# Patient Record
Sex: MALE | Race: WHITE | Employment: FULL TIME | URBAN - METROPOLITAN AREA
[De-identification: names, ages, dates, MRNs, and addresses within clinical notes are randomized per-mention and may not be internally consistent; named-entity substitution may affect disease eponyms.]

---

## 2017-01-29 ENCOUNTER — APPOINTMENT (OUTPATIENT)
Dept: GENERAL RADIOLOGY | Age: 32
End: 2017-01-29
Attending: EMERGENCY MEDICINE
Payer: SELF-PAY

## 2017-01-29 ENCOUNTER — HOSPITAL ENCOUNTER (EMERGENCY)
Age: 32
Discharge: HOME | End: 2017-01-29
Attending: EMERGENCY MEDICINE
Payer: SELF-PAY

## 2017-01-29 DIAGNOSIS — T14.8XXA MUSCLE STRAIN: Primary | ICD-10-CM

## 2017-01-29 PROCEDURE — 99282 EMERGENCY DEPT VISIT SF MDM: CPT

## 2017-01-29 PROCEDURE — 73060 X-RAY EXAM OF HUMERUS: CPT

## 2017-01-29 NOTE — DISCHARGE INSTRUCTIONS
Biceps Tendinitis: Exercises  Your Care Instructions  Here are some examples of typical rehabilitation exercises for your condition. Start each exercise slowly. Ease off the exercise if you start to have pain. Your doctor or physical therapist will tell you when you can start these exercises and which ones will work best for you. How to do the exercises  Biceps stretch    1. Stand and hold your affected arm out to the side, with your hand at about hip level. 2. Gently bend your wrist back so that your fingers point down toward the floor. 3. You may also do this next to a wall and rest your fingers on the wall. 4. For more of a stretch, bend your head to the opposite side of your affected arm. 5. Hold for 15 to 30 seconds. 6. Repeat 2 to 4 times. Resisted supination    Note: For this and the following exercises, you will need elastic exercise material, such as surgical tubing or Thera-Band. 1. Sit leaning forward with your legs slightly spread. Then place your forearm on your thigh with your hand and affected wrist in front of your knee. 2. Grasp one end of an exercise band with your palm down, and step on the other end. 3. Keeping your wrist straight, roll your palm outward and away from your thigh for a count of 2, then slowly move your wrist back to the starting position to a count of 5.  4. Repeat 8 to 12 times. Resisted elbow flexion    1. Using your affected arm, hold one end of an elastic band in your hand. 2. Place the other end of the band under your foot on the same side of your body as your affected arm. 3. Slowly bend your elbow and bring your hand toward your shoulder. Your palm and the underside of your wrist should be facing up as you pull the band toward your shoulder. Count to 2 as you pull up. 4. Relax and slowly return to your starting position. Count to 5 as you return to the start. 5. Repeat 8 to 12 times. Resisted elbow flexion at shoulder level    1.  Tie the ends of an exercise band together to form a loop. Attach one end of the loop to a secure object or shut a door on it to hold it in place. (Or you can have someone hold one end of the loop to provide resistance.) The band should be at shoulder level. 2. Stand facing where you have tied or fastened the band. 3. Start with your affected arm held out straight, holding the band in your hand. 4. Slowly bend your elbow to 90 degrees, bringing your hand toward your forehead. Count to 2 as you pull the band toward your head. 5. Relax and slowly return to your starting position. Count to 5 as you return to the start. 6. Repeat 8 to 12 times. Follow-up care is a key part of your treatment and safety. Be sure to make and go to all appointments, and call your doctor if you are having problems. It's also a good idea to know your test results and keep a list of the medicines you take. Where can you learn more? Go to http://earline-ariel.info/. Enter I727 in the search box to learn more about \"Biceps Tendinitis: Exercises. \"  Current as of: May 23, 2016  Content Version: 11.1  © 4355-3892 [email or web address]. Care instructions adapted under license by [employer] (which disclaims liability or warranty for this information). If you have questions about a medical condition or this instruction, always ask your healthcare professional. [place] [address] any warranty or liability for your use of this information.

## 2017-01-29 NOTE — LETTER
[facility] EM[facility] 
[address] [id number] 
[phone] Work/School Note Date: 1/29/2017 To Whom It May concern: 
 
Tre Ace was seen and treated today in the emergency room by the following provider(s): 
Attending Provider: Justine Goldstein. MD Tash.   
 
Tre Ace may return to work on 1/30/17 to restricted duty. No lifting weight over 10 pounds until cleared by physician. Sincerely, 
 
 
 
 
Justine Goldstein.  MD Tash

## 2017-01-29 NOTE — ED NOTES
Pt received discharge instructions from Dr. Brenda Frances and verbalized understanding. Pt ambulatory to exit.
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
## 2021-12-23 ENCOUNTER — HOSPITAL ENCOUNTER (EMERGENCY)
Age: 36
Discharge: HOME | End: 2021-12-23
Attending: EMERGENCY MEDICINE

## 2021-12-23 VITALS
BODY MASS INDEX: 33.79 KG/M2 | WEIGHT: 236 LBS | HEIGHT: 70 IN | TEMPERATURE: 98.7 F | OXYGEN SATURATION: 99 % | HEART RATE: 60 BPM | DIASTOLIC BLOOD PRESSURE: 60 MMHG | SYSTOLIC BLOOD PRESSURE: 131 MMHG | RESPIRATION RATE: 18 BRPM

## 2021-12-23 DIAGNOSIS — R53.83 MALAISE AND FATIGUE: Primary | ICD-10-CM

## 2021-12-23 DIAGNOSIS — R53.81 MALAISE AND FATIGUE: Primary | ICD-10-CM

## 2021-12-23 DIAGNOSIS — I73.00 RAYNAUD'S PHENOMENON WITHOUT GANGRENE: ICD-10-CM

## 2021-12-23 LAB
ALBUMIN SERPL-MCNC: 3.6 G/DL (ref 3.5–5)
ALBUMIN/GLOB SERPL: 1 {RATIO} (ref 1.1–2.2)
ALP SERPL-CCNC: 60 U/L (ref 45–117)
ALT SERPL-CCNC: 35 U/L (ref 12–78)
ANION GAP SERPL CALC-SCNC: 5 MMOL/L (ref 5–15)
AST SERPL-CCNC: 26 U/L (ref 15–37)
BASOPHILS # BLD: 0.1 K/UL (ref 0–0.1)
BASOPHILS NFR BLD: 1 % (ref 0–1)
BILIRUB SERPL-MCNC: 0.2 MG/DL (ref 0.2–1)
BUN SERPL-MCNC: 19 MG/DL (ref 6–20)
BUN/CREAT SERPL: 18 (ref 12–20)
CALCIUM SERPL-MCNC: 8.8 MG/DL (ref 8.5–10.1)
CHLORIDE SERPL-SCNC: 107 MMOL/L (ref 97–108)
CO2 SERPL-SCNC: 28 MMOL/L (ref 21–32)
COMMENT, HOLDF: NORMAL
CREAT SERPL-MCNC: 1.06 MG/DL (ref 0.7–1.3)
DIFFERENTIAL METHOD BLD: NORMAL
EOSINOPHIL # BLD: 0.4 K/UL (ref 0–0.4)
EOSINOPHIL NFR BLD: 4 % (ref 0–7)
ERYTHROCYTE [DISTWIDTH] IN BLOOD BY AUTOMATED COUNT: 12.8 % (ref 11.5–14.5)
GLOBULIN SER CALC-MCNC: 3.5 G/DL (ref 2–4)
GLUCOSE SERPL-MCNC: 83 MG/DL (ref 65–100)
HCT VFR BLD AUTO: 44.4 % (ref 36.6–50.3)
HGB BLD-MCNC: 14.9 G/DL (ref 12.1–17)
IMM GRANULOCYTES # BLD AUTO: 0 K/UL (ref 0–0.04)
IMM GRANULOCYTES NFR BLD AUTO: 0 % (ref 0–0.5)
LYMPHOCYTES # BLD: 2 K/UL (ref 0.8–3.5)
LYMPHOCYTES NFR BLD: 23 % (ref 12–49)
MCH RBC QN AUTO: 31.4 PG (ref 26–34)
MCHC RBC AUTO-ENTMCNC: 33.6 G/DL (ref 30–36.5)
MCV RBC AUTO: 93.7 FL (ref 80–99)
MONOCYTES # BLD: 0.6 K/UL (ref 0–1)
MONOCYTES NFR BLD: 6 % (ref 5–13)
NEUTS SEG # BLD: 5.9 K/UL (ref 1.8–8)
NEUTS SEG NFR BLD: 66 % (ref 32–75)
NRBC # BLD: 0 K/UL (ref 0–0.01)
NRBC BLD-RTO: 0 PER 100 WBC
PLATELET # BLD AUTO: 225 K/UL (ref 150–400)
PMV BLD AUTO: 10.4 FL (ref 8.9–12.9)
POTASSIUM SERPL-SCNC: 3.7 MMOL/L (ref 3.5–5.1)
PROT SERPL-MCNC: 7.1 G/DL (ref 6.4–8.2)
RBC # BLD AUTO: 4.74 M/UL (ref 4.1–5.7)
SAMPLES BEING HELD,HOLD: NORMAL
SODIUM SERPL-SCNC: 140 MMOL/L (ref 136–145)
TSH SERPL DL<=0.05 MIU/L-ACNC: 1.03 UIU/ML (ref 0.36–3.74)
WBC # BLD AUTO: 9 K/UL (ref 4.1–11.1)

## 2021-12-23 PROCEDURE — 80053 COMPREHEN METABOLIC PANEL: CPT

## 2021-12-23 PROCEDURE — 84443 ASSAY THYROID STIM HORMONE: CPT

## 2021-12-23 PROCEDURE — 85025 COMPLETE CBC W/AUTO DIFF WBC: CPT

## 2021-12-23 PROCEDURE — 99282 EMERGENCY DEPT VISIT SF MDM: CPT

## 2021-12-23 NOTE — ED NOTES
Pt has noticed feeling a lot more fatigued with some weight gain (10-15lbs within the last month). Pt then noticed right shoulder pain and 3rd digit began to change colors and went numb, cold tip of finger, pain down distal from shoulder into eldbow and down distal to wrist and hand. Pt uses biofreeze and ibuprofen at home for pain. No medications for pain today.

## 2021-12-24 NOTE — ED PROVIDER NOTES
EMERGENCY DEPARTMENT HISTORY AND PHYSICAL EXAM      Date: 12/23/2021  Patient Name: Hallie Kaba    History of Presenting Illness     Chief Complaint   Patient presents with    Shoulder Pain     Pt report al symptoms x 1 month ago of decreased energy and rigth shoulder pain     Elbow Pain    Finger Pain     Pt noticed his right ring finger is numb and ice cold        History Provided By: Patient    HPI: Hallie Kaba, 31 y.o. male with PMHx significant for nothing who presents with a cc of generalized fatigue for the last few weeks as well as a chief complaint of intermittent numbness and pallor to the right ring finger. He states that he has felt generally fatigued, has gained some weight, had joint pains ongoing for last 3 weeks. For last 2 days he has noted some pain and pallor to the right ring finger intermittently. He states the digit feels very cold and turns white. It self resolves. He has not noted in association with cold temperatures. No history of Raynaud's. PCP: None    There are no other complaints, changes, or physical findings at this time. Past History     Past Medical History:  No past medical history on file. Past Surgical History:  Past Surgical History:   Procedure Laterality Date    HX CARPAL TUNNEL RELEASE Right     HX HEENT      Right eye surgery    HX ORTHOPAEDIC      Thumb surgery     Family History:  Family History   Problem Relation Age of Onset    Lung Disease Maternal Grandfather     Lung Disease Paternal Grandfather      Social History:  Social History     Tobacco Use    Smoking status: Current Every Day Smoker     Packs/day: 1.00    Smokeless tobacco: Not on file   Substance Use Topics    Alcohol use: Yes     Comment: rare    Drug use: No     Allergies:   Allergies   Allergen Reactions    Amoxicillin Hives     Tolerates cephalexin    Codeine Other (comments)     Chest pain  Tolerates oxycodone/APAP     Review of Systems   Review of Systems Constitutional: Positive for fatigue. Negative for chills and fever. HENT: Negative for congestion, rhinorrhea and sore throat. Respiratory: Negative for cough and shortness of breath. Cardiovascular: Negative for chest pain. Gastrointestinal: Negative for abdominal pain, nausea and vomiting. Genitourinary: Negative for dysuria and urgency. Musculoskeletal: Positive for arthralgias. Skin: Negative for rash. Neurological: Negative for dizziness, light-headedness and headaches. All other systems reviewed and are negative. Physical Exam   Physical Exam  Vitals and nursing note reviewed. Constitutional:       General: He is not in acute distress. Appearance: He is well-developed. HENT:      Head: Normocephalic and atraumatic. Eyes:      Conjunctiva/sclera: Conjunctivae normal.      Pupils: Pupils are equal, round, and reactive to light. Cardiovascular:      Rate and Rhythm: Normal rate and regular rhythm. Pulmonary:      Effort: Pulmonary effort is normal. No respiratory distress. Breath sounds: Normal breath sounds. No stridor. Abdominal:      General: There is no distension. Palpations: Abdomen is soft. Tenderness: There is no abdominal tenderness. Musculoskeletal:         General: Normal range of motion. Cervical back: Normal range of motion. Comments: Right ring finger: Normal skin tone, normal capillary refill    2+ radial pulse   Skin:     General: Skin is warm and dry. Neurological:      Mental Status: He is alert and oriented to person, place, and time. Diagnostic Study Results   Labs -     Recent Results (from the past 12 hour(s))   SAMPLES BEING HELD    Collection Time: 12/23/21  6:04 PM   Result Value Ref Range    SAMPLES BEING HELD PST, LAV, RED     COMMENT        Add-on orders for these samples will be processed based on acceptable specimen integrity and analyte stability, which may vary by analyte.    CBC WITH AUTOMATED DIFF Collection Time: 12/23/21  7:08 PM   Result Value Ref Range    WBC 9.0 4.1 - 11.1 K/uL    RBC 4.74 4.10 - 5.70 M/uL    HGB 14.9 12.1 - 17.0 g/dL    HCT 44.4 36.6 - 50.3 %    MCV 93.7 80.0 - 99.0 FL    MCH 31.4 26.0 - 34.0 PG    MCHC 33.6 30.0 - 36.5 g/dL    RDW 12.8 11.5 - 14.5 %    PLATELET 620 503 - 973 K/uL    MPV 10.4 8.9 - 12.9 FL    NRBC 0.0 0  WBC    ABSOLUTE NRBC 0.00 0.00 - 0.01 K/uL    NEUTROPHILS 66 32 - 75 %    LYMPHOCYTES 23 12 - 49 %    MONOCYTES 6 5 - 13 %    EOSINOPHILS 4 0 - 7 %    BASOPHILS 1 0 - 1 %    IMMATURE GRANULOCYTES 0 0.0 - 0.5 %    ABS. NEUTROPHILS 5.9 1.8 - 8.0 K/UL    ABS. LYMPHOCYTES 2.0 0.8 - 3.5 K/UL    ABS. MONOCYTES 0.6 0.0 - 1.0 K/UL    ABS. EOSINOPHILS 0.4 0.0 - 0.4 K/UL    ABS. BASOPHILS 0.1 0.0 - 0.1 K/UL    ABS. IMM. GRANS. 0.0 0.00 - 0.04 K/UL    DF AUTOMATED     METABOLIC PANEL, COMPREHENSIVE    Collection Time: 12/23/21  7:08 PM   Result Value Ref Range    Sodium 140 136 - 145 mmol/L    Potassium 3.7 3.5 - 5.1 mmol/L    Chloride 107 97 - 108 mmol/L    CO2 28 21 - 32 mmol/L    Anion gap 5 5 - 15 mmol/L    Glucose 83 65 - 100 mg/dL    BUN 19 6 - 20 MG/DL    Creatinine 1.06 0.70 - 1.30 MG/DL    BUN/Creatinine ratio 18 12 - 20      GFR est AA >60 >60 ml/min/1.73m2    GFR est non-AA >60 >60 ml/min/1.73m2    Calcium 8.8 8.5 - 10.1 MG/DL    Bilirubin, total 0.2 0.2 - 1.0 MG/DL    ALT (SGPT) 35 12 - 78 U/L    AST (SGOT) 26 15 - 37 U/L    Alk. phosphatase 60 45 - 117 U/L    Protein, total 7.1 6.4 - 8.2 g/dL    Albumin 3.6 3.5 - 5.0 g/dL    Globulin 3.5 2.0 - 4.0 g/dL    A-G Ratio 1.0 (L) 1.1 - 2.2     TSH 3RD GENERATION    Collection Time: 12/23/21  7:08 PM   Result Value Ref Range    TSH 1.03 0.36 - 3.74 uIU/mL       Radiologic Studies -   No orders to display     No results found. Medical Decision Making   I am the first provider for this patient.     I reviewed the vital signs, available nursing notes, past medical history, past surgical history, family history and social history. Vital Signs-Reviewed the patient's vital signs. Patient Vitals for the past 12 hrs:   Temp Pulse Resp BP SpO2   12/23/21 1707 98.7 °F (37.1 °C) 60 18 131/60 99 %       Pulse Oximetry Analysis - 99% on ra    Records Reviewed: Nursing Notes and Old Medical Records    Provider Notes (Medical Decision Making):   Patient presents with fatigue, body aches, as well as intermittent pallor of the right fourth finger. Intermittent pallor and pain of the right fourth digit sounds most consistent with Raynaud's. Currently finger is not discolored. In terms of his fatigue and weight gain will check basic lab work to rule out electrolyte balance or anemia. Also check thyroid studies. ED Course:   Initial assessment performed. The patients presenting problems have been discussed, and they are in agreement with the care plan formulated and outlined with them. I have encouraged them to ask questions as they arise throughout their visit. Lab work unremarkable. Patient encouraged to follow-up with a primary care provider for additional testing as needed. Procedures:  Procedures    Critical Care:  none    Disposition:  Discharge Note:  The patient has been re-evaluated and is ready for discharge. Reviewed available results with patient. Counseled patient on diagnosis and care plan. Patient has expressed understanding, and all questions have been answered. Patient agrees with plan and agrees to follow up as recommended, or to return to the ED if their symptoms worsen. Discharge instructions have been provided and explained to the patient, along with reasons to return to the ED. PLAN:  1. There are no discharge medications for this patient.     2.   Follow-up Information     Follow up With Specialties Details Why Contact Info    your PCP  Schedule an appointment as soon as possible for a visit       [facility] EMERGENCY DEPT Emergency Medicine  As needed, If symptoms worsen [address] [facility] [address]  [phone]        Return to ED if worse     Diagnosis     Clinical Impression:   1. Malaise and fatigue    2. Raynaud's phenomenon without gangrene            Please note that this dictation was completed with [employer], the computer voice recognition software. Quite often unanticipated grammatical, syntax, homophones, and other interpretive errors are inadvertently transcribed by the computer software. Please disregard these errors.   Please excuse any errors that have escaped final proofreading

## 2022-07-25 ENCOUNTER — APPOINTMENT (OUTPATIENT)
Dept: CT IMAGING | Age: 37
End: 2022-07-25
Attending: PHYSICIAN ASSISTANT

## 2022-07-25 ENCOUNTER — HOSPITAL ENCOUNTER (EMERGENCY)
Age: 37
Discharge: HOME | End: 2022-07-25
Attending: STUDENT IN AN ORGANIZED HEALTH CARE EDUCATION/TRAINING PROGRAM

## 2022-07-25 VITALS
SYSTOLIC BLOOD PRESSURE: 121 MMHG | HEIGHT: 70 IN | DIASTOLIC BLOOD PRESSURE: 48 MMHG | OXYGEN SATURATION: 98 % | TEMPERATURE: 98.1 F | BODY MASS INDEX: 36.23 KG/M2 | RESPIRATION RATE: 16 BRPM | WEIGHT: 253.09 LBS | HEART RATE: 64 BPM

## 2022-07-25 DIAGNOSIS — R91.1 LUNG NODULE SEEN ON IMAGING STUDY: ICD-10-CM

## 2022-07-25 DIAGNOSIS — J01.90 ACUTE NON-RECURRENT SINUSITIS, UNSPECIFIED LOCATION: ICD-10-CM

## 2022-07-25 DIAGNOSIS — R09.89 GLOBUS SENSATION: Primary | ICD-10-CM

## 2022-07-25 LAB
ANION GAP SERPL CALC-SCNC: 5 MMOL/L (ref 5–15)
BASOPHILS # BLD: 0.1 K/UL (ref 0–0.1)
BASOPHILS NFR BLD: 1 % (ref 0–1)
BUN SERPL-MCNC: 18 MG/DL (ref 6–20)
BUN/CREAT SERPL: 16 (ref 12–20)
CALCIUM SERPL-MCNC: 9.3 MG/DL (ref 8.5–10.1)
CHLORIDE SERPL-SCNC: 106 MMOL/L (ref 97–108)
CO2 SERPL-SCNC: 28 MMOL/L (ref 21–32)
CREAT SERPL-MCNC: 1.12 MG/DL (ref 0.7–1.3)
DEPRECATED S PYO AG THROAT QL EIA: NEGATIVE
DIFFERENTIAL METHOD BLD: NORMAL
EOSINOPHIL # BLD: 0.3 K/UL (ref 0–0.4)
EOSINOPHIL NFR BLD: 4 % (ref 0–7)
ERYTHROCYTE [DISTWIDTH] IN BLOOD BY AUTOMATED COUNT: 12.9 % (ref 11.5–14.5)
GLUCOSE SERPL-MCNC: 87 MG/DL (ref 65–100)
HCT VFR BLD AUTO: 44.1 % (ref 36.6–50.3)
HGB BLD-MCNC: 14.5 G/DL (ref 12.1–17)
IMM GRANULOCYTES # BLD AUTO: 0 K/UL (ref 0–0.04)
IMM GRANULOCYTES NFR BLD AUTO: 0 % (ref 0–0.5)
LYMPHOCYTES # BLD: 1.8 K/UL (ref 0.8–3.5)
LYMPHOCYTES NFR BLD: 27 % (ref 12–49)
MCH RBC QN AUTO: 31 PG (ref 26–34)
MCHC RBC AUTO-ENTMCNC: 32.9 G/DL (ref 30–36.5)
MCV RBC AUTO: 94.2 FL (ref 80–99)
MONOCYTES # BLD: 0.5 K/UL (ref 0–1)
MONOCYTES NFR BLD: 8 % (ref 5–13)
NEUTS SEG # BLD: 4.1 K/UL (ref 1.8–8)
NEUTS SEG NFR BLD: 60 % (ref 32–75)
NRBC # BLD: 0 K/UL (ref 0–0.01)
NRBC BLD-RTO: 0 PER 100 WBC
PLATELET # BLD AUTO: 219 K/UL (ref 150–400)
PMV BLD AUTO: 10.3 FL (ref 8.9–12.9)
POTASSIUM SERPL-SCNC: 4 MMOL/L (ref 3.5–5.1)
RBC # BLD AUTO: 4.68 M/UL (ref 4.1–5.7)
SODIUM SERPL-SCNC: 139 MMOL/L (ref 136–145)
WBC # BLD AUTO: 6.8 K/UL (ref 4.1–11.1)

## 2022-07-25 PROCEDURE — 85025 COMPLETE CBC W/AUTO DIFF WBC: CPT

## 2022-07-25 PROCEDURE — 36415 COLL VENOUS BLD VENIPUNCTURE: CPT

## 2022-07-25 PROCEDURE — 70491 CT SOFT TISSUE NECK W/DYE: CPT

## 2022-07-25 PROCEDURE — 99285 EMERGENCY DEPT VISIT HI MDM: CPT

## 2022-07-25 PROCEDURE — 87070 CULTURE OTHR SPECIMN AEROBIC: CPT

## 2022-07-25 PROCEDURE — 74011000636 HC RX REV CODE- 636: Performed by: RADIOLOGY

## 2022-07-25 PROCEDURE — 80048 BASIC METABOLIC PNL TOTAL CA: CPT

## 2022-07-25 PROCEDURE — 87880 STREP A ASSAY W/OPTIC: CPT

## 2022-07-25 RX ORDER — AZITHROMYCIN 250 MG/1
TABLET, FILM COATED ORAL
Qty: 6 TABLET | Refills: 0 | Status: SHIPPED | OUTPATIENT
Start: 2022-07-25 | End: 2022-07-30

## 2022-07-25 RX ADMIN — IOPAMIDOL 100 ML: 755 INJECTION, SOLUTION INTRAVENOUS at 21:00

## 2022-07-25 NOTE — ED PROVIDER NOTES
EMERGENCY DEPARTMENT HISTORY AND PHYSICAL EXAM      Date: 7/25/2022  Patient Name: Amena Wyatt    History of Presenting Illness     Chief Complaint   Patient presents with    Neck Pain     X 1 week pt has had a sensation of tightness or swelling in the right side of his esophagus causing pain and tightness in his jaw and behind his ear. He states that for the most part he can swallow, but one day earlier in the week he ate and then had to throw up       History Provided By: Patient, wife at bedside    HPI: Amena Wyatt, 30 y.o. male without reported PMHx presents BIB self to the ED with cc of sensation of swelling in the right side of his neck x1 week. The patient describes the symptoms as \"like someone is pushing their thumb against the right side of my esophagus. \"  The discomfort radiates down towards the right upper chest. The patient also reports a tingling sensation to the right side of his jaw and cheek. Generally speaking the patient is eating and drinking normally, however last Wednesday he had several episodes of vomiting just after eating. The patient states \"I would eat and then it would just come right back up. \"  Denies fever, cough, shortness of breath, chest pain. The patient has had no relief with benadryl, Tums, or Rolaids. There are no other complaints, changes, or physical findings at this time. PCP: None    No current facility-administered medications on file prior to encounter. No current outpatient medications on file prior to encounter. Past History     Past Medical History:  No past medical history on file.     Past Surgical History:  Past Surgical History:   Procedure Laterality Date    HX CARPAL TUNNEL RELEASE Right     HX HEENT      Right eye surgery    HX ORTHOPAEDIC      Thumb surgery       Family History:  Family History   Problem Relation Age of Onset    Lung Disease Maternal Grandfather     Lung Disease Paternal Grandfather        Social History:  Social History     Tobacco Use    Smoking status: Every Day     Packs/day: 1.00     Types: Cigarettes   Substance Use Topics    Alcohol use: Yes     Comment: rare    Drug use: No       Allergies: Allergies   Allergen Reactions    Amoxicillin Hives     Tolerates cephalexin    Codeine Other (comments)     Chest pain  Tolerates oxycodone/APAP         Review of Systems   Review of Systems   Constitutional:  Negative for fever. HENT:  Negative for trouble swallowing and voice change. Globus sensation   Eyes:  Negative for redness. Respiratory:  Negative for shortness of breath. Cardiovascular:  Negative for chest pain. Gastrointestinal:  Positive for vomiting (several episodes last wednesday, none since). All other systems reviewed and are negative. Physical Exam   Physical Exam  Vitals and nursing note reviewed. Constitutional:       General: He is not in acute distress. Appearance: Normal appearance. He is not toxic-appearing. HENT:      Head: Normocephalic and atraumatic. Jaw: There is normal jaw occlusion. Comments: No facial or neck asymmetry appreciated externally. Nose: Nose normal.      Mouth/Throat:      Mouth: Mucous membranes are moist.      Comments: Faintly appreciable uvula deviation to the L. No oropharyngeal erythema, appreciable mass, or exudate. Eyes:      Extraocular Movements: Extraocular movements intact. Conjunctiva/sclera: Conjunctivae normal.   Neck:      Trachea: Phonation normal.   Cardiovascular:      Rate and Rhythm: Normal rate and regular rhythm. Pulmonary:      Effort: Pulmonary effort is normal.      Breath sounds: Normal breath sounds. Abdominal:      Palpations: Abdomen is soft. Tenderness: There is no abdominal tenderness. There is no guarding. Musculoskeletal:         General: Normal range of motion. Cervical back: Normal range of motion and neck supple. No rigidity or tenderness.    Lymphadenopathy:      Cervical: No cervical adenopathy. Skin:     General: Skin is warm and dry. Neurological:      General: No focal deficit present. Mental Status: He is alert and oriented to person, place, and time. GCS: GCS eye subscore is 4. GCS verbal subscore is 5. GCS motor subscore is 6. Cranial Nerves: No cranial nerve deficit. Psychiatric:         Mood and Affect: Mood normal.         Behavior: Behavior normal.       Diagnostic Study Results     Labs -     Recent Results (from the past 12 hour(s))   STREP AG SCREEN, GROUP A    Collection Time: 07/25/22  7:52 PM    Specimen: Swab; Throat   Result Value Ref Range    Group A Strep Ag ID Negative NEG     CBC WITH AUTOMATED DIFF    Collection Time: 07/25/22  7:52 PM   Result Value Ref Range    WBC 6.8 4.1 - 11.1 K/uL    RBC 4.68 4.10 - 5.70 M/uL    HGB 14.5 12.1 - 17.0 g/dL    HCT 44.1 36.6 - 50.3 %    MCV 94.2 80.0 - 99.0 FL    MCH 31.0 26.0 - 34.0 PG    MCHC 32.9 30.0 - 36.5 g/dL    RDW 12.9 11.5 - 14.5 %    PLATELET 090 184 - 880 K/uL    MPV 10.3 8.9 - 12.9 FL    NRBC 0.0 0  WBC    ABSOLUTE NRBC 0.00 0.00 - 0.01 K/uL    NEUTROPHILS 60 32 - 75 %    LYMPHOCYTES 27 12 - 49 %    MONOCYTES 8 5 - 13 %    EOSINOPHILS 4 0 - 7 %    BASOPHILS 1 0 - 1 %    IMMATURE GRANULOCYTES 0 0.0 - 0.5 %    ABS. NEUTROPHILS 4.1 1.8 - 8.0 K/UL    ABS. LYMPHOCYTES 1.8 0.8 - 3.5 K/UL    ABS. MONOCYTES 0.5 0.0 - 1.0 K/UL    ABS. EOSINOPHILS 0.3 0.0 - 0.4 K/UL    ABS. BASOPHILS 0.1 0.0 - 0.1 K/UL    ABS. IMM.  GRANS. 0.0 0.00 - 0.04 K/UL    DF AUTOMATED     METABOLIC PANEL, BASIC    Collection Time: 07/25/22  7:52 PM   Result Value Ref Range    Sodium 139 136 - 145 mmol/L    Potassium 4.0 3.5 - 5.1 mmol/L    Chloride 106 97 - 108 mmol/L    CO2 28 21 - 32 mmol/L    Anion gap 5 5 - 15 mmol/L    Glucose 87 65 - 100 mg/dL    BUN 18 6 - 20 MG/DL    Creatinine 1.12 0.70 - 1.30 MG/DL    BUN/Creatinine ratio 16 12 - 20      GFR est AA >60 >60 ml/min/1.73m2    GFR est non-AA >60 >60 ml/min/1.73m2 Calcium 9.3 8.5 - 10.1 MG/DL       Radiologic Studies -   CT NECK SOFT TISSUE W CONT   Final Result      1. A foreign body is not identified   2. Sinus mucosal inflammatory change   3. 10 mm mixed density groundglass/solid nodule in the right apex. It has a   peripheral calcification. Recommend follow-up in 6 months to ensure stability. CT Results  (Last 48 hours)                 07/25/22 2100  CT NECK SOFT TISSUE W CONT Final result    Impression:      1. A foreign body is not identified   2. Sinus mucosal inflammatory change   3. 10 mm mixed density groundglass/solid nodule in the right apex. It has a   peripheral calcification. Recommend follow-up in 6 months to ensure stability. Narrative:  INDICATION: R sided swelling and ? FB sensation       COMPARISON: None       TECHNIQUE:  Axial neck CT was performed after the uneventful administration of   IV contrast. Sagittal and coronal reformats were generated. CT dose reduction was achieved through use of a standardized protocol tailored   for this examination and automatic exposure control for dose modulation. CONTRAST: 100 mL Isovue 370       FINDINGS:       NASOPHARYNX: Mild sinus mucosal inflammatory change within the ethmoid and   sphenoid sinuses. .   SUPRAHYOID NECK: Normal oropharynx, oral cavity, parapharyngeal space, and   retropharyngeal space. INFRAHYOID NECK: Normal larynx, hypopharynx and supraglottis. PAROTID GLANDS: Normal.   SUBMANDIBULAR GLANDS: Normal.   THYROID GLAND: Normal. No nodule. LYMPH NODES: None enlarged by CT size criteria . LUNG APICES: 10 mm mixed density nodule in the right upper lobe. It has a small   eccentric area of calcification. .   OSSEOUS STRUCTURES: No destructive bone lesion. ADDITIONAL COMMENTS: N/A. CXR Results  (Last 48 hours)      None              Medical Decision Making   I am the first provider for this patient.     I reviewed the vital signs, available nursing notes, past medical history, past surgical history, family history and social history. Vital Signs-Reviewed the patient's vital signs. Patient Vitals for the past 12 hrs:   Temp Pulse Resp BP SpO2   07/25/22 1701 98.1 °F (36.7 °C) 64 16 (!) 121/48 98 %       Records Reviewed: Nursing Notes and Old Medical Records    Provider Notes (Medical Decision Making):     Ddx globus sensation, esophageal spasm, achalasia, diverticulum, Schatzki's ring, postnasal drip from sinusitis    ED Course:   Initial assessment performed. The patients presenting problems have been discussed, and they are in agreement with the care plan formulated and outlined with them. I have encouraged them to ask questions as they arise throughout their visit. ED Course as of 07/25/22 2133 Mon Jul 25, 2022 2125 Reviewed all lab and imaging findings with the patient and his wife. He is noted to be eating Chex mix without any difficulty. We discussed the findings of sinusitis on CT and the possibility that his throat symptoms may be related to postnasal drip. We will trial a course of azithromycin and assess for improvement. We also discussed the lung nodule found incidentally. The patient has been told about this incidental finding in the past.  I recommended that he follow-up with his PCP to compare to prior studies to ensure the nodule has remained stable. If symptoms persist after trial of azithromycin, I encouraged the patient to follow-up with gastroenterology for further evaluation. He may need barium studies and/or endoscopy. ED return precautions reviewed. The patient is in agreement this plan. [AS]      ED Course User Index  [AS] KHOA Higgins       Critical Care Time: None    Disposition:  dc    PLAN:  1. Current Discharge Medication List        START taking these medications    Details   azithromycin (Zithromax Z-Lane) 250 mg tablet Take 2 tablets on day 1, then 1 tablet daily for days 2 through 5.   Qty: 6 Tablet, Refills: 0  Start date: 7/25/2022, End date: 7/30/2022             2.   Follow-up Information       Follow up With Specialties Details Why Contact Info    [facility] EMERGENCY DEPT Emergency Medicine  As needed, If symptoms worsen Jessica Carey [facility]  [phone]    Your PCP  Call  For follow up     [facility]  Call  For follow up [address] [address]          Return to ED if worse     Diagnosis     Clinical Impression:   1. Globus sensation    2. Acute non-recurrent sinusitis, unspecified location    3. Lung nodule seen on imaging study          Please note that this dictation was completed with [employer], the computer voice recognition software. Quite often unanticipated grammatical, syntax, homophones, and other interpretive errors are inadvertently transcribed by the computer software. Please disregards these errors. Please excuse any errors that have escaped final proofreading.

## 2022-07-27 LAB
BACTERIA SPEC CULT: NORMAL
SERVICE CMNT-IMP: NORMAL

## 2022-11-16 ENCOUNTER — TRANSCRIBE ORDER (OUTPATIENT)
Dept: SCHEDULING | Age: 37
End: 2022-11-16

## 2022-11-16 DIAGNOSIS — R91.1 COIN LESION: Primary | ICD-10-CM

## 2022-11-22 ENCOUNTER — HOSPITAL ENCOUNTER (OUTPATIENT)
Dept: CT IMAGING | Age: 37
Discharge: HOME | End: 2022-11-22
Attending: NURSE PRACTITIONER
Payer: COMMERCIAL

## 2022-11-22 DIAGNOSIS — R91.1 COIN LESION: ICD-10-CM

## 2022-11-22 PROCEDURE — 74011000636 HC RX REV CODE- 636

## 2022-11-22 PROCEDURE — 71260 CT THORAX DX C+: CPT

## 2022-11-22 RX ADMIN — IOPAMIDOL 100 ML: 612 INJECTION, SOLUTION INTRAVENOUS at 09:49

## 2023-03-13 ENCOUNTER — HOSPITAL ENCOUNTER (EMERGENCY)
Age: 38
Discharge: HOME | End: 2023-03-13
Attending: EMERGENCY MEDICINE
Payer: COMMERCIAL

## 2023-03-13 ENCOUNTER — APPOINTMENT (OUTPATIENT)
Dept: GENERAL RADIOLOGY | Age: 38
End: 2023-03-13
Attending: EMERGENCY MEDICINE
Payer: COMMERCIAL

## 2023-03-13 VITALS
DIASTOLIC BLOOD PRESSURE: 77 MMHG | HEIGHT: 70 IN | WEIGHT: 287.48 LBS | HEART RATE: 69 BPM | TEMPERATURE: 97.9 F | RESPIRATION RATE: 20 BRPM | BODY MASS INDEX: 41.16 KG/M2 | SYSTOLIC BLOOD PRESSURE: 131 MMHG | OXYGEN SATURATION: 94 %

## 2023-03-13 DIAGNOSIS — M54.50 ACUTE BILATERAL LOW BACK PAIN WITHOUT SCIATICA: Primary | ICD-10-CM

## 2023-03-13 LAB
ANION GAP SERPL CALC-SCNC: 1 MMOL/L (ref 5–15)
BASOPHILS # BLD: 0.1 K/UL (ref 0–0.1)
BASOPHILS NFR BLD: 1 % (ref 0–1)
BUN SERPL-MCNC: 12 MG/DL (ref 6–20)
BUN/CREAT SERPL: 10 (ref 12–20)
CALCIUM SERPL-MCNC: 8.9 MG/DL (ref 8.5–10.1)
CHLORIDE SERPL-SCNC: 104 MMOL/L (ref 97–108)
CK SERPL-CCNC: 188 U/L (ref 39–308)
CO2 SERPL-SCNC: 32 MMOL/L (ref 21–32)
CREAT SERPL-MCNC: 1.15 MG/DL (ref 0.7–1.3)
DIFFERENTIAL METHOD BLD: ABNORMAL
EOSINOPHIL # BLD: 0.2 K/UL (ref 0–0.4)
EOSINOPHIL NFR BLD: 3 % (ref 0–7)
ERYTHROCYTE [DISTWIDTH] IN BLOOD BY AUTOMATED COUNT: 13 % (ref 11.5–14.5)
GLUCOSE SERPL-MCNC: 95 MG/DL (ref 65–100)
HCT VFR BLD AUTO: 51.5 % (ref 36.6–50.3)
HGB BLD-MCNC: 16.6 G/DL (ref 12.1–17)
IMM GRANULOCYTES # BLD AUTO: 0 K/UL (ref 0–0.04)
IMM GRANULOCYTES NFR BLD AUTO: 0 % (ref 0–0.5)
LYMPHOCYTES # BLD: 1.5 K/UL (ref 0.8–3.5)
LYMPHOCYTES NFR BLD: 20 % (ref 12–49)
MCH RBC QN AUTO: 31.1 PG (ref 26–34)
MCHC RBC AUTO-ENTMCNC: 32.2 G/DL (ref 30–36.5)
MCV RBC AUTO: 96.6 FL (ref 80–99)
MONOCYTES # BLD: 0.6 K/UL (ref 0–1)
MONOCYTES NFR BLD: 9 % (ref 5–13)
NEUTS SEG # BLD: 4.9 K/UL (ref 1.8–8)
NEUTS SEG NFR BLD: 67 % (ref 32–75)
NRBC # BLD: 0 K/UL (ref 0–0.01)
NRBC BLD-RTO: 0 PER 100 WBC
PLATELET # BLD AUTO: 228 K/UL (ref 150–400)
PMV BLD AUTO: 10.1 FL (ref 8.9–12.9)
POTASSIUM SERPL-SCNC: 4.4 MMOL/L (ref 3.5–5.1)
RBC # BLD AUTO: 5.33 M/UL (ref 4.1–5.7)
SODIUM SERPL-SCNC: 137 MMOL/L (ref 136–145)
WBC # BLD AUTO: 7.2 K/UL (ref 4.1–11.1)

## 2023-03-13 PROCEDURE — 36415 COLL VENOUS BLD VENIPUNCTURE: CPT

## 2023-03-13 PROCEDURE — 99284 EMERGENCY DEPT VISIT MOD MDM: CPT

## 2023-03-13 PROCEDURE — 80048 BASIC METABOLIC PNL TOTAL CA: CPT

## 2023-03-13 PROCEDURE — 74011250636 HC RX REV CODE- 250/636: Performed by: EMERGENCY MEDICINE

## 2023-03-13 PROCEDURE — 96374 THER/PROPH/DIAG INJ IV PUSH: CPT

## 2023-03-13 PROCEDURE — 96375 TX/PRO/DX INJ NEW DRUG ADDON: CPT

## 2023-03-13 PROCEDURE — 85025 COMPLETE CBC W/AUTO DIFF WBC: CPT

## 2023-03-13 PROCEDURE — 82550 ASSAY OF CK (CPK): CPT

## 2023-03-13 PROCEDURE — 72202 X-RAY EXAM SI JOINTS 3/> VWS: CPT

## 2023-03-13 RX ORDER — METHOCARBAMOL 750 MG/1
750 TABLET, FILM COATED ORAL
Qty: 21 TABLET | Refills: 0 | Status: SHIPPED | OUTPATIENT
Start: 2023-03-13

## 2023-03-13 RX ORDER — MORPHINE SULFATE 2 MG/ML
4 INJECTION, SOLUTION INTRAMUSCULAR; INTRAVENOUS
Status: COMPLETED | OUTPATIENT
Start: 2023-03-13 | End: 2023-03-13

## 2023-03-13 RX ORDER — ONDANSETRON 2 MG/ML
4 INJECTION INTRAMUSCULAR; INTRAVENOUS ONCE
Status: COMPLETED | OUTPATIENT
Start: 2023-03-13 | End: 2023-03-13

## 2023-03-13 RX ORDER — KETOROLAC TROMETHAMINE 30 MG/ML
30 INJECTION, SOLUTION INTRAMUSCULAR; INTRAVENOUS
Status: COMPLETED | OUTPATIENT
Start: 2023-03-13 | End: 2023-03-13

## 2023-03-13 RX ORDER — METHYLPREDNISOLONE 4 MG/1
TABLET ORAL
Qty: 1 DOSE PACK | Refills: 0 | Status: SHIPPED | OUTPATIENT
Start: 2023-03-13

## 2023-03-13 RX ORDER — OXYCODONE AND ACETAMINOPHEN 5; 325 MG/1; MG/1
1 TABLET ORAL
Qty: 12 TABLET | Refills: 0 | Status: SHIPPED | OUTPATIENT
Start: 2023-03-13 | End: 2023-03-16

## 2023-03-13 RX ADMIN — ONDANSETRON 4 MG: 2 INJECTION INTRAMUSCULAR; INTRAVENOUS at 12:07

## 2023-03-13 RX ADMIN — KETOROLAC TROMETHAMINE 30 MG: 30 INJECTION, SOLUTION INTRAMUSCULAR at 12:07

## 2023-03-13 RX ADMIN — MORPHINE SULFATE 4 MG: 2 INJECTION, SOLUTION INTRAMUSCULAR; INTRAVENOUS at 12:08

## 2023-03-13 NOTE — DISCHARGE INSTRUCTIONS
You are seen in the ER for your symptoms. Your x-ray was normal.  Please continue to take Motrin as well as the steroids. Please use the muscle relaxers as needed. Use the Percocet for severe pain. Please follow-up with your primary care doctor and the orthopedic doctor as needed. We are always happy to reevaluate you for new or worsening symptoms including weakness, difficulty urinating or having a bowel movement or numbness in your groin.

## 2023-03-13 NOTE — ED PROVIDER NOTES
[facility]  EMERGENCY DEPARTMENT ENCOUNTER       Pt Name: Rocco Campbell  MRN: [id number]  [place] 1985  Date of evaluation: 3/13/2023  Provider: Linda Márquez MD   PCP: Summer Vela NP  Note Started: 11:55 AM 3/13/23     CHIEF COMPLAINT       Chief Complaint   Patient presents with    Hip Pain     Patient reports after working out Friday (legs and back), bilateral hips and lower back began to hurt. He reports pain has increased. Not tender to touch but movement and breathing hurts. Denies  or GI sxs        HISTORY OF PRESENT ILLNESS: 1 or more elements      History From: patient, History limited by: none     Rocco Campbell is a 41 y.o. male with no significant past medical history presents emergency department with a chief complaint of hip pain. Patient reports on Friday he squatted approximately 400 pounds. He reports he did not have pain doing this and denies any acute injuries. He reports later that afternoon and into the evening he developed bilateral hip pain. The pain is located his bilateral SI joints and does not radiate to his midline back. He denies any leg numbness, saddle anesthesia, weakness or bladder/stool incontinence. He reports over the past weekend he has been bedbound due to the pain. He reports it radiates along his hip and is worse with lifting his leg and movement. Denies any abdominal pain. Denies vomiting or diarrhea. Has been taking ibuprofen without relief. Please See MDM for Additional Details of the HPI/PMH  Nursing Notes were all reviewed and agreed with or any disagreements were addressed in the HPI. REVIEW OF SYSTEMS        Positives and Pertinent negatives as per HPI. PAST HISTORY     Past Medical History:  No past medical history on file.     Past Surgical History:  Past Surgical History:   Procedure Laterality Date    HX CARPAL TUNNEL RELEASE Right     HX HEENT      Right eye surgery    HX ORTHOPAEDIC      Thumb surgery       Family History:  Family History   Problem Relation Age of Onset    Lung Disease Maternal Grandfather     Lung Disease Paternal Grandfather        Social History:  Social History     Tobacco Use    Smoking status: Every Day     Packs/day: 1.00     Types: Cigarettes   Substance Use Topics    Alcohol use: Yes     Comment: rare    Drug use: No       Allergies: Allergies   Allergen Reactions    Amoxicillin Hives     Tolerates cephalexin    Codeine Other (comments)     Chest pain  Tolerates oxycodone/APAP       CURRENT MEDICATIONS      Discharge Medication List as of 3/13/2023  1:22 PM          SCREENINGS               No data recorded         PHYSICAL EXAM      ED Triage Vitals   ED Encounter Vitals Group      BP 03/13/23 1054 (!) 139/48      Pulse (Heart Rate) 03/13/23 1054 72      Resp Rate 03/13/23 1054 18      Temp 03/13/23 1054 97.9 °F (36.6 °C)      Temp src --       O2 Sat (%) 03/13/23 1054 100 %      Weight 03/13/23 1055 287 lb 7.7 oz      Height 03/13/23 1055 5' 10\"        Physical Exam  Vitals and nursing note reviewed. Constitutional:       Comments: 42-year-old male, resting on stretcher, no acute distress   HENT:      Head: Normocephalic. Cardiovascular:      Rate and Rhythm: Normal rate and regular rhythm. Pulmonary:      Effort: Pulmonary effort is normal.   Abdominal:      General: Abdomen is flat. Tenderness: There is no abdominal tenderness. Musculoskeletal:         General: No tenderness. Normal range of motion. Comments: Lumbar spine without step-off or crepitus. No bony tenderness to palpation. Mild SI joint pain bilaterally. Skin:     General: Skin is warm. Neurological:      General: No focal deficit present. Mental Status: He is alert. Sensory: No sensory deficit. Motor: No weakness. Comments: Bilateral lower extremities without significant weakness or paresthesias.    Psychiatric:         Mood and Affect: Mood normal.        DIAGNOSTIC RESULTS   LABS: Recent Results (from the past 12 hour(s))   CBC WITH AUTOMATED DIFF    Collection Time: 03/13/23 11:13 AM   Result Value Ref Range    WBC 7.2 4.1 - 11.1 K/uL    RBC 5.33 4.10 - 5.70 M/uL    HGB 16.6 12.1 - 17.0 g/dL    HCT 51.5 (H) 36.6 - 50.3 %    MCV 96.6 80.0 - 99.0 FL    MCH 31.1 26.0 - 34.0 PG    MCHC 32.2 30.0 - 36.5 g/dL    RDW 13.0 11.5 - 14.5 %    PLATELET 543 135 - 229 K/uL    MPV 10.1 8.9 - 12.9 FL    NRBC 0.0 0  WBC    ABSOLUTE NRBC 0.00 0.00 - 0.01 K/uL    NEUTROPHILS 67 32 - 75 %    LYMPHOCYTES 20 12 - 49 %    MONOCYTES 9 5 - 13 %    EOSINOPHILS 3 0 - 7 %    BASOPHILS 1 0 - 1 %    IMMATURE GRANULOCYTES 0 0.0 - 0.5 %    ABS. NEUTROPHILS 4.9 1.8 - 8.0 K/UL    ABS. LYMPHOCYTES 1.5 0.8 - 3.5 K/UL    ABS. MONOCYTES 0.6 0.0 - 1.0 K/UL    ABS. EOSINOPHILS 0.2 0.0 - 0.4 K/UL    ABS. BASOPHILS 0.1 0.0 - 0.1 K/UL    ABS. IMM. GRANS. 0.0 0.00 - 0.04 K/UL    DF AUTOMATED     METABOLIC PANEL, BASIC    Collection Time: 03/13/23 11:13 AM   Result Value Ref Range    Sodium 137 136 - 145 mmol/L    Potassium 4.4 3.5 - 5.1 mmol/L    Chloride 104 97 - 108 mmol/L    CO2 32 21 - 32 mmol/L    Anion gap 1 (L) 5 - 15 mmol/L    Glucose 95 65 - 100 mg/dL    BUN 12 6 - 20 MG/DL    Creatinine 1.15 0.70 - 1.30 MG/DL    BUN/Creatinine ratio 10 (L) 12 - 20      eGFR >60 >60 ml/min/1.73m2    Calcium 8.9 8.5 - 10.1 MG/DL   CK    Collection Time: 03/13/23 11:13 AM   Result Value Ref Range     39 - 308 U/L        EKG: If performed, independent interpretation documented below in the MDM section     RADIOLOGY:  Non-plain film images such as CT, Ultrasound and MRI are read by the radiologist. Plain radiographic images are visualized and preliminarily interpreted by the ED Provider with the findings documented in the MDM section. Interpretation per the Radiologist below, if available at the time of this note:     XR SI JTS MIN 3 V    Result Date: 3/13/2023  EXAM: XR SI JTS MIN 3 V INDICATION: Back pain. COMPARISON: None. FINDINGS: An AP view of the sacrum and right and left posterior oblique views of the sacroiliac joints demonstrate no abnormality. Normal sacroiliac joints. PROCEDURES   Unless otherwise noted below, none  Procedures     CRITICAL CARE TIME   0    EMERGENCY DEPARTMENT COURSE and DIFFERENTIAL DIAGNOSIS/MDM   Vitals:    Vitals:    03/13/23 1054 03/13/23 1055 03/13/23 1218 03/13/23 1232   BP: (!) 139/48  133/83 131/77   Pulse: 72  69    Resp: 18  20    Temp: 97.9 °F (36.6 °C)      SpO2: 100%  93% 94%   Weight:  130.4 kg (287 lb 7.7 oz)     Height:  5' 10\" (1.778 m)          Patient was given the following medications:  Medications   ketorolac (TORADOL) injection 30 mg (30 mg IntraVENous Given 3/13/23 1207)   morphine injection 4 mg (4 mg IntraVENous Given 3/13/23 1208)   ondansetron (ZOFRAN) injection 4 mg (4 mg IntraVENous Given 3/13/23 1207)       Medical Decision Making  28-year-old male presents emergency department with a chief complaint of bilateral SI joint pain. Vitals are unremarkable. Suspect this is in setting of recent weight lifting. We will check plain film to exclude fracture although my suspicion for this is low, likely musculoskeletal, DJD or herniated disc. No evidence of cauda equina. No evidence of sciatica. Will medicate for pain and anticipate discharge. Amount and/or Complexity of Data Reviewed  Labs: ordered. Decision-making details documented in ED Course. Radiology: ordered and independent interpretation performed. Decision-making details documented in ED Course. Risk  Prescription drug management. ED Course as of 03/13/23 1344   Mon Mar 13, 2023   1240 Labs WNL [MB]   1306 Plain film as interpreted by me shows no acute fracture or dislocation. [MB]   1315 Reassessed, mild improvement in symptoms. Will discharge with supportive care, PCP follow-up, ortho PRN. Discussed returned precautions.  [MB]      ED Course User Index  [MB] Burton Fajardo MD         FINAL IMPRESSION     1. Acute bilateral low back pain without sciatica          DISPOSITION/PLAN   Dallin Holguin  results have been reviewed with him. He has been counseled regarding his diagnosis, treatment, and plan. He verbally conveys understanding and agreement of the signs, symptoms, diagnosis, treatment and prognosis and additionally agrees to follow up as discussed. He also agrees with the care-plan and conveys that all of his questions have been answered. I have also provided discharge instructions for him that include: educational information regarding their diagnosis and treatment, and list of reasons why they would want to return to the ED prior to their follow-up appointment, should his condition change. CLINICAL IMPRESSION    Discharged     PATIENT REFERRED TO:  Follow-up Information       Follow up With Specialties Details Why Contact Info    Greg Fraser NP Nurse Practitioner In 3 days  [address] [facility] [phone]      [facility] EM[facility] Emergency Medicine  If symptoms worsen [address]  [facility] [address] [facility]    Los Oneal MD Orthopedic Surgery In 1 week As needed [address]  [address] [facility] [address]  [address] [address] [phone]                DISCHARGE MEDICATIONS:  Discharge Medication List as of 3/13/2023  1:22 PM        START taking these medications    Details   oxyCODONE-acetaminophen (Percocet) 5-325 mg per tablet Take 1 Tablet by mouth every six (6) hours as needed for Pain for up to 3 days. Max Daily Amount: 4 Tablets., Normal, Disp-12 Tablet, R-0      methocarbamoL (ROBAXIN) 750 mg tablet Take 1 Tablet by mouth three (3) times daily as needed for Muscle Spasm(s). , Normal, Disp-21 Tablet, R-0      methylPREDNISolone (Medrol, Lane,) 4 mg tablet Use as directed, Normal, Disp-1 Dose Pack, R-0                 DISCONTINUED MEDICATIONS:  Discharge Medication List as of 3/13/2023  1:22 PM            I am the Primary Clinician of Record. Mabel Beverly MD (electronically signed)    (Please note that parts of this dictation were completed with voice recognition software. Quite often unanticipated grammatical, syntax, homophones, and other interpretive errors are inadvertently transcribed by the computer software. Please disregards these errors.  Please excuse any errors that have escaped final proofreading.)

## 2023-03-13 NOTE — ED NOTES
I have reviewed discharge instructions with the patient. The patient verbalized understanding. Current Discharge Medication List      START taking these medications    Details   oxyCODONE-acetaminophen (Percocet) 5-325 mg per tablet Take 1 Tablet by mouth every six (6) hours as needed for Pain for up to 3 days. Max Daily Amount: 4 Tablets. Qty: 12 Tablet, Refills: 0  Start date: 3/13/2023, End date: 3/16/2023    Associated Diagnoses: Acute bilateral low back pain without sciatica      methocarbamoL (ROBAXIN) 750 mg tablet Take 1 Tablet by mouth three (3) times daily as needed for Muscle Spasm(s).   Qty: 21 Tablet, Refills: 0  Start date: 3/13/2023      methylPREDNISolone (Medrol, Lane,) 4 mg tablet Use as directed  Qty: 1 Dose Pack, Refills: 0  Start date: 3/13/2023

## 2023-03-27 ENCOUNTER — APPOINTMENT (OUTPATIENT)
Dept: GENERAL RADIOLOGY | Age: 38
End: 2023-03-27
Attending: STUDENT IN AN ORGANIZED HEALTH CARE EDUCATION/TRAINING PROGRAM
Payer: COMMERCIAL

## 2023-03-27 ENCOUNTER — HOSPITAL ENCOUNTER (EMERGENCY)
Age: 38
Discharge: HOME | End: 2023-03-27
Attending: STUDENT IN AN ORGANIZED HEALTH CARE EDUCATION/TRAINING PROGRAM
Payer: COMMERCIAL

## 2023-03-27 ENCOUNTER — APPOINTMENT (OUTPATIENT)
Dept: CT IMAGING | Age: 38
End: 2023-03-27
Attending: STUDENT IN AN ORGANIZED HEALTH CARE EDUCATION/TRAINING PROGRAM
Payer: COMMERCIAL

## 2023-03-27 VITALS
OXYGEN SATURATION: 99 % | TEMPERATURE: 98.2 F | HEART RATE: 74 BPM | SYSTOLIC BLOOD PRESSURE: 130 MMHG | DIASTOLIC BLOOD PRESSURE: 59 MMHG | RESPIRATION RATE: 16 BRPM | HEIGHT: 70 IN | WEIGHT: 285 LBS | BODY MASS INDEX: 40.8 KG/M2

## 2023-03-27 DIAGNOSIS — L73.9 HAIR FOLLICLE INFECTION: ICD-10-CM

## 2023-03-27 DIAGNOSIS — R59.0 CERVICAL LYMPHADENOPATHY: Primary | ICD-10-CM

## 2023-03-27 PROCEDURE — 99285 EMERGENCY DEPT VISIT HI MDM: CPT

## 2023-03-27 PROCEDURE — 70491 CT SOFT TISSUE NECK W/DYE: CPT

## 2023-03-27 PROCEDURE — 71046 X-RAY EXAM CHEST 2 VIEWS: CPT

## 2023-03-27 PROCEDURE — 74011000636 HC RX REV CODE- 636: Performed by: STUDENT IN AN ORGANIZED HEALTH CARE EDUCATION/TRAINING PROGRAM

## 2023-03-27 RX ADMIN — IOMEPROL INJECTION 100 ML: 714 INJECTION, SOLUTION INTRAVASCULAR at 14:50

## 2023-03-27 NOTE — ED PROVIDER NOTES
[facility] EMERGENCY DEPT  EMERGENCY DEPARTMENT ENCOUNTER       Pt Name: Rachel Martinez  MRN: [id number]  [place] 1985  Date of evaluation: 3/27/2023  Provider: Linda Medina MD   PCP: Yulissa Vega NP  Note Started: 2:00 PM 3/27/23     CHIEF COMPLAINT       Chief Complaint   Patient presents with    Facial Pain     Pt reports lump Friday to left side of neck/jaw which has now progressed to left side of face causing pain, swelling, redness. Took 50 mg benadryl @0800           HISTORY OF PRESENT ILLNESS: 1 or more elements      History From: Patient, History limited by: none     Rachel Martinez is a 41 y.o. male presenting with left neck swelling and pain as well as left eye swelling and pain. He notes that started on Friday. It started on of the left submandibular region has been extending up over the temple to his left eye. Notes he thought it was a pimple or ingrown hair and was picking at it a bit. This did not help. Denies any changes in his vision. Denies any fever. Denies any difficulty swallowing. He notes he feels a little node under his submandibular region. Has not been sick recently. Took some ibuprofen and Benadryl this morning but it did not help. Denies any ear discharge, cough, congestion, nausea, vomiting. Please See MDM for Additional Details of the HPI/PMH  Nursing Notes were all reviewed and agreed with or any disagreements were addressed in the HPI. REVIEW OF SYSTEMS        Positives and Pertinent negatives as per HPI. PAST HISTORY     Past Medical History:  No past medical history on file.     Past Surgical History:  Past Surgical History:   Procedure Laterality Date    HX CARPAL TUNNEL RELEASE Right     HX HEENT      Right eye surgery    HX ORTHOPAEDIC      Thumb surgery       Family History:  Family History   Problem Relation Age of Onset    Lung Disease Maternal Grandfather     Lung Disease Paternal Grandfather        Social History:  Social History     Tobacco Use Smoking status: Every Day     Packs/day: 1.00     Types: Cigarettes   Substance Use Topics    Alcohol use: Yes     Comment: rare    Drug use: No       Allergies: Allergies   Allergen Reactions    Amoxicillin Hives     Tolerates cephalexin    Codeine Other (comments)     Chest pain  Tolerates oxycodone/APAP       CURRENT MEDICATIONS      Previous Medications    METHOCARBAMOL (ROBAXIN) 750 MG TABLET    Take 1 Tablet by mouth three (3) times daily as needed for Muscle Spasm(s). METHYLPREDNISOLONE (MEDROL, JERRI,) 4 MG TABLET    Use as directed       SCREENINGS               No data recorded         PHYSICAL EXAM      ED Triage Vitals [03/27/23 1313]   ED Encounter Vitals Group      BP (!) 130/59      Pulse (Heart Rate) 74      Resp Rate 16      Temp 98.2 °F (36.8 °C)      Temp src       O2 Sat (%) 99 %      Weight 285 lb      Height 5' 10\"        Physical Exam  Vitals reviewed. HENT:      Head: Normocephalic and atraumatic. Right Ear: Tympanic membrane and ear canal normal.      Left Ear: Tympanic membrane and ear canal normal.      Mouth/Throat:      Mouth: Mucous membranes are dry. Eyes:      Extraocular Movements: Extraocular movements intact. Pupils: Pupils are equal, round, and reactive to light. Comments: Scant erythema and swelling over the left eyelid. Extraocular is intact. Eyes himself appear normal   Neck:      Comments: Mild tenderness on the submandibular region. Small node appreciated submandibularly. Cardiovascular:      Rate and Rhythm: Normal rate and regular rhythm. Abdominal:      General: Abdomen is flat. Palpations: Abdomen is soft. Musculoskeletal:      Cervical back: Normal range of motion. No rigidity. DIAGNOSTIC RESULTS   LABS:     No results found for this or any previous visit (from the past 12 hour(s)). EKG:  If performed, independent interpretation documented below in the MDM section     RADIOLOGY:  Non-plain film images such as CT, Ultrasound and MRI are read by the radiologist. Plain radiographic images are visualized and preliminarily interpreted by the ED Provider with the findings documented in the MDM section. Interpretation per the Radiologist below, if available at the time of this note:     XR CHEST PA LAT    Result Date: 3/27/2023  EXAM: XR CHEST PA LAT INDICATION: Left-sided facial swelling, concerning for underlying chest tumor COMPARISON: None TECHNIQUE: PA and lateral chest views FINDINGS: The cardiac size is within normal limits. The pulmonary vasculature is within normal limits. The lungs and pleural spaces are clear. The visualized bones and upper abdomen are age-appropriate. Normal PA and lateral chest views. CT NECK SOFT TISSUE W CONT    Result Date: 3/27/2023  INDICATION: left submandibular swelling and redness extending up to left eyebrow COMPARISON: 7/25/2022 TECHNIQUE:  Axial neck CT was performed after the uneventful administration of IV contrast. Sagittal and coronal reformats were generated. CT dose reduction was achieved through use of a standardized protocol tailored for this examination and automatic exposure control for dose modulation. CONTRAST: 100 mL Isovue 370 FINDINGS: NASOPHARYNX: Normal. SUPRAHYOID NECK: Normal oropharynx, oral cavity, parapharyngeal space, and retropharyngeal space. INFRAHYOID NECK: Normal larynx, hypopharynx and supraglottis. PAROTID GLANDS: Normal. SUBMANDIBULAR GLANDS: Normal. THYROID GLAND: Normal. No nodule. LYMPH NODES: None enlarged by CT size criteria . LUNG APICES: A small focus of scarring is seen in the posterior right apex. . OSSEOUS STRUCTURES: No destructive bone lesion. ADDITIONAL COMMENTS: N/A. No evidence of acute process. Lydia Field        PROCEDURES   Unless otherwise noted below, none  Procedures     CRITICAL CARE TIME   none    EMERGENCY DEPARTMENT COURSE and DIFFERENTIAL DIAGNOSIS/MDM   Vitals:    Vitals:    03/27/23 1313   BP: (!) 130/59   Pulse: 74   Resp: 16   Temp: 98.2 °F (36.8 °C)   SpO2: 99%   Weight: 129.3 kg (285 lb)   Height: 5' 10\" (1.778 m)        Patient was given the following medications:  Medications   iomeprol (IOMERON 350) 350 mg iodine /mL (71.44 %) contrast injection 100 mL (100 mL IntraVENous Given 3/27/23 1450)       Medical Decision Making  61-year-old male with no past medical history presenting with left neck pain and swelling and left eye swelling. Vitals stable and reassuring upon arrival.  On exam he does have a little bit of swelling just under the submandibular region on the left side. Small node appreciated but small, soft and mobile. Differentials include parotitis, cervical adenopathy, pharyngitis, lymphatic drainage, cellulitis, folliculitis. Possible source on the left eyebrow with possibly ingrown hair folliculitis extending down to lymphatic spread with some backup. His throat exam is unremarkable with normal tonsils and no signs of strep throat. No other systemic findings. He could have a parotid stone or some parotid pathology as well so we will get a CT soft tissue of the neck extending up to the eye to evaluate for any signs of abscess or formation. Amount and/or Complexity of Data Reviewed  Radiology: ordered. Risk  Prescription drug management. ED Course as of 03/27/23 1622   Mon Mar 27, 2023   1620 Reviewed CT - no acute pathology except right lung scarring. Obtained CXR to eval that and check for svc syndrome - negative. Discussed warm compresses and return precautions. [JS]      ED Course User Index  [JS] Adela Barnett MD         FINAL IMPRESSION     1. Cervical lymphadenopathy    2. Hair follicle infection          DISPOSITION/PLAN   Buster Sicks  results have been reviewed with him. He has been counseled regarding his diagnosis, treatment, and plan. He verbally conveys understanding and agreement of the signs, symptoms, diagnosis, treatment and prognosis and additionally agrees to follow up as discussed. He also agrees with the care-plan and conveys that all of his questions have been answered. I have also provided discharge instructions for him that include: educational information regarding their diagnosis and treatment, and list of reasons why they would want to return to the ED prior to their follow-up appointment, should his condition change. CLINICAL IMPRESSION    Discharge Note: The patient is stable for discharge home. The signs, symptoms, diagnosis, and discharge instructions have been discussed, understanding conveyed, and agreed upon. The patient is to follow up as recommended or return to ER should their symptoms worsen. PATIENT REFERRED TO:  Follow-up Information       Follow up With Specialties Details Why Contact Info    Peace Ellison NP Nurse Practitioner In 1 week  [address] [facility] [phone]      [facility] EMERGENCY DEPT Emergency Medicine  If symptoms worsen [address] [place] [address]  [phone]              DISCHARGE MEDICATIONS:  Current Discharge Medication List            DISCONTINUED MEDICATIONS:  Current Discharge Medication List          I am the Primary Clinician of Record. Lulu August MD (electronically signed)    (Please note that parts of this dictation were completed with voice recognition software. Quite often unanticipated grammatical, syntax, homophones, and other interpretive errors are inadvertently transcribed by the computer software. Please disregards these errors.  Please excuse any errors that have escaped final proofreading.)